# Patient Record
Sex: MALE
[De-identification: names, ages, dates, MRNs, and addresses within clinical notes are randomized per-mention and may not be internally consistent; named-entity substitution may affect disease eponyms.]

---

## 2022-02-14 ENCOUNTER — APPOINTMENT (OUTPATIENT)
Dept: ENDOCRINOLOGY | Facility: CLINIC | Age: 37
End: 2022-02-14

## 2022-02-14 PROBLEM — Z00.00 ENCOUNTER FOR PREVENTIVE HEALTH EXAMINATION: Status: ACTIVE | Noted: 2022-02-14

## 2022-05-19 ENCOUNTER — APPOINTMENT (OUTPATIENT)
Dept: ENDOCRINOLOGY | Facility: CLINIC | Age: 37
End: 2022-05-19
Payer: COMMERCIAL

## 2022-05-19 VITALS
RESPIRATION RATE: 15 BRPM | SYSTOLIC BLOOD PRESSURE: 134 MMHG | TEMPERATURE: 98.6 F | BODY MASS INDEX: 27.4 KG/M2 | HEART RATE: 74 BPM | OXYGEN SATURATION: 98 % | HEIGHT: 69 IN | WEIGHT: 185 LBS | DIASTOLIC BLOOD PRESSURE: 80 MMHG

## 2022-05-19 DIAGNOSIS — R79.89 OTHER SPECIFIED ABNORMAL FINDINGS OF BLOOD CHEMISTRY: ICD-10-CM

## 2022-05-19 DIAGNOSIS — Z78.9 OTHER SPECIFIED HEALTH STATUS: ICD-10-CM

## 2022-05-19 DIAGNOSIS — I10 ESSENTIAL (PRIMARY) HYPERTENSION: ICD-10-CM

## 2022-05-19 DIAGNOSIS — E11.9 TYPE 2 DIABETES MELLITUS W/OUT COMPLICATIONS: ICD-10-CM

## 2022-05-19 DIAGNOSIS — E78.5 HYPERLIPIDEMIA, UNSPECIFIED: ICD-10-CM

## 2022-05-19 DIAGNOSIS — Z87.891 PERSONAL HISTORY OF NICOTINE DEPENDENCE: ICD-10-CM

## 2022-05-19 PROCEDURE — 99205 OFFICE O/P NEW HI 60 MIN: CPT

## 2022-05-19 RX ORDER — SIMVASTATIN 20 MG/1
20 TABLET, FILM COATED ORAL
Refills: 0 | Status: ACTIVE | COMMUNITY

## 2022-05-19 RX ORDER — METFORMIN HYDROCHLORIDE 1000 MG/1
1000 TABLET, COATED ORAL
Refills: 0 | Status: ACTIVE | COMMUNITY

## 2022-05-19 RX ORDER — LISINOPRIL 10 MG/1
10 TABLET ORAL
Refills: 0 | Status: ACTIVE | COMMUNITY

## 2022-05-19 RX ORDER — DAPAGLIFLOZIN 10 MG/1
10 TABLET, FILM COATED ORAL
Refills: 0 | Status: ACTIVE | COMMUNITY

## 2022-05-24 ENCOUNTER — APPOINTMENT (OUTPATIENT)
Dept: ENDOCRINOLOGY | Facility: CLINIC | Age: 37
End: 2022-05-24

## 2022-07-20 ENCOUNTER — FORM ENCOUNTER (OUTPATIENT)
Age: 37
End: 2022-07-20

## 2022-08-01 ENCOUNTER — NON-APPOINTMENT (OUTPATIENT)
Age: 37
End: 2022-08-01

## 2022-09-13 LAB
HBA1C MFR BLD HPLC: 7.1
LDLC SERPL DIRECT ASSAY-MCNC: 96

## 2022-09-14 ENCOUNTER — APPOINTMENT (OUTPATIENT)
Dept: ENDOCRINOLOGY | Facility: CLINIC | Age: 37
End: 2022-09-14

## 2022-12-03 ENCOUNTER — APPOINTMENT (OUTPATIENT)
Dept: ORTHOPEDIC SURGERY | Facility: CLINIC | Age: 37
End: 2022-12-03

## 2022-12-03 VITALS
WEIGHT: 195 LBS | HEART RATE: 98 BPM | HEIGHT: 69 IN | SYSTOLIC BLOOD PRESSURE: 113 MMHG | TEMPERATURE: 97.9 F | BODY MASS INDEX: 28.88 KG/M2 | DIASTOLIC BLOOD PRESSURE: 76 MMHG

## 2022-12-03 PROCEDURE — 72100 X-RAY EXAM L-S SPINE 2/3 VWS: CPT

## 2022-12-03 PROCEDURE — 99204 OFFICE O/P NEW MOD 45 MIN: CPT

## 2022-12-03 NOTE — DISCUSSION/SUMMARY
[de-identified] : At this time I do want to go ahead and get an MRI of the lumbar spine without contrast to evaluate for disc herniation causing the left lower extremity neuropathy,?  Sciatica.  In the interim the patient will continue with the ibuprofen which does help him temporarily.  I gave him a physical therapy prescription to perform stretching and core strengthening exercises.  Once the MRI is completed I will call the patient and perform a TTM with him about the results.  If there is a disc herniation there, I will refer him to a physiatrist.  All of his questions were answered and the patient understood and agreed to the treatment plan.

## 2022-12-03 NOTE — PHYSICAL EXAM
[de-identified] : Lumbosacral Physical Examination: \par \par General: Alert and oriented x3.  In no acute distress.  Pleasant in nature with a normal affect.  No apparent respiratory distress. \par Inspection: No swelling, No scoliosis present.\par \par ROM:\par Forward Flexion: 70 degrees\par Extension: 20 degrees\par Lateral Flexion to Left: 30 degrees\par Lateral Flexion to Right: 30 degrees\par Rotation to Left: 40 degrees\par Rotation to Right: 40 degrees\par \par Normal Hip ROM.\par \par Palpation: \par Thoracolumbar Paraspinal Muscles: Positive\par Costovertebral Angle Pain/Spine Percussion: Negative for pain over the Kidney's with Spine Percussion.\par \par Special Tests:\par Straight Leg Raise: Positive left lower extremity.\par \par Neurovascularly Intact Sensory and Motor with No Foot Drop present on examination today.  [de-identified] : X-rays 2 views lumbar spine reviewed, 12/3/2022: Normal x-rays of the lumbar spine.

## 2022-12-03 NOTE — HISTORY OF PRESENT ILLNESS
[de-identified] : The patient is a 37-year-old male who presents with left-sided lower back pain with pain radiating down his left leg.  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  About 4-6 weeks ago he woke up and started to have severe pain shooting down his left leg.  He states that every morning he wakes up and he has a weakness and pain in his left leg and it takes him some time to get going and walking.  He denies bowel or bladder incontinence.  He has no weakness in his legs at this time or ankles.  He takes 4 ibuprofen every morning since his injury 4 to 6 weeks ago which helped some temporarily.  He is concerned at this time that the pain has not subsided.  He is here to be evaluated.

## 2022-12-14 ENCOUNTER — OUTPATIENT (OUTPATIENT)
Dept: OUTPATIENT SERVICES | Facility: HOSPITAL | Age: 37
LOS: 1 days | End: 2022-12-14
Payer: COMMERCIAL

## 2022-12-14 ENCOUNTER — APPOINTMENT (OUTPATIENT)
Dept: MRI IMAGING | Facility: CLINIC | Age: 37
End: 2022-12-14

## 2022-12-14 DIAGNOSIS — M54.42 LUMBAGO WITH SCIATICA, LEFT SIDE: ICD-10-CM

## 2022-12-14 PROCEDURE — 72148 MRI LUMBAR SPINE W/O DYE: CPT | Mod: 26

## 2022-12-14 PROCEDURE — 72148 MRI LUMBAR SPINE W/O DYE: CPT

## 2022-12-21 ENCOUNTER — APPOINTMENT (OUTPATIENT)
Dept: ORTHOPEDIC SURGERY | Facility: CLINIC | Age: 37
End: 2022-12-21

## 2022-12-21 DIAGNOSIS — G89.29 LUMBAGO WITH SCIATICA, LEFT SIDE: ICD-10-CM

## 2022-12-21 DIAGNOSIS — M54.42 LUMBAGO WITH SCIATICA, LEFT SIDE: ICD-10-CM

## 2022-12-21 PROCEDURE — 99446 NTRPROF PH1/NTRNET/EHR 5-10: CPT

## 2023-01-20 ENCOUNTER — APPOINTMENT (OUTPATIENT)
Dept: PHYSICAL MEDICINE AND REHAB | Facility: CLINIC | Age: 38
End: 2023-01-20
Payer: COMMERCIAL

## 2023-01-20 VITALS
HEIGHT: 69 IN | SYSTOLIC BLOOD PRESSURE: 116 MMHG | HEART RATE: 83 BPM | RESPIRATION RATE: 14 BRPM | WEIGHT: 200 LBS | DIASTOLIC BLOOD PRESSURE: 68 MMHG | BODY MASS INDEX: 29.62 KG/M2

## 2023-01-20 DIAGNOSIS — Z86.79 PERSONAL HISTORY OF OTHER DISEASES OF THE CIRCULATORY SYSTEM: ICD-10-CM

## 2023-01-20 DIAGNOSIS — M54.16 RADICULOPATHY, LUMBAR REGION: ICD-10-CM

## 2023-01-20 DIAGNOSIS — Z78.9 OTHER SPECIFIED HEALTH STATUS: ICD-10-CM

## 2023-01-20 DIAGNOSIS — Z86.39 PERSONAL HISTORY OF OTHER ENDOCRINE, NUTRITIONAL AND METABOLIC DISEASE: ICD-10-CM

## 2023-01-20 DIAGNOSIS — M51.26 OTHER INTERVERTEBRAL DISC DISPLACEMENT, LUMBAR REGION: ICD-10-CM

## 2023-01-20 PROCEDURE — 99203 OFFICE O/P NEW LOW 30 MIN: CPT

## 2023-01-20 NOTE — DATA REVIEWED
[FreeTextEntry1] : MR L Spine 12/14/22 reviewed by me: L5-S1 disc protrusion seen that abuts left S1 nerve root\par \par   MR Lumbar Spine No Cont             Final\par \par No Documents Attached\par \par \par \par \par   EXAM: [id number] - MR SPINE LUMBAR  - ORDERED BY: ZOHREH GOULD\par \par \par PROCEDURE DATE:  12/14/2022\par \par \par \par INTERPRETATION:  CLINICAL INFORMATION: Back pain and left radiculopathy\par \par ADDITIONAL CLINICAL INFORMATION: Other Condition see Clinical Info\par \par TECHNIQUE: Multiplanar, multisequence MRI was performed of the lumbar spine.\par IV Contrast: NONE\par \par PRIOR STUDIES: No priors available for comparison.\par \par FINDINGS:\par \par LOCALIZER: No additional findings.\par BONES: Vertebral body heights are maintained.\par ALIGNMENT: The alignment is normal.\par SACROILIAC JOINTS/SACRUM: The visualized portions are unremarkable.\par CONUS AND CAUDA EQUINA: The distal cord and conus are normal in signal. Conus terminates at L1.\par VISUALIZED INTRAPELVIC/INTRA-ABDOMINAL SOFT TISSUES: Normal.\par PARASPINAL SOFT TISSUES: Normal.\par \par \par INDIVIDUAL LEVELS:\par \par LOWER THORACIC SPINE: No spinal canal or neuroforaminal stenosis.\par \par L1-L2: No spinal canal or neuroforaminal stenosis.\par L2-L3: No spinal canal or neuroforaminal stenosis.\par L3-L4: No spinal canal or neuroforaminal stenosis.\par L4-L5: No spinal canal or neuroforaminal stenosis.\par L5-S1: Small broad-based central-left paracentral disc protrusion which abuts the descending left S1 nerve root. No spinal canal or foraminal narrowing.\par \par \par IMPRESSION:\par \par Small broad-based central-left paracentral disc protrusion at L5-S1 which abuts the descending left S1 nerve root.\par \par --- End of Report ---\par \par \par \par \par \par \par JULIA NGUYEN MD; Attending Radiologist\par This document has been electronically signed. Dec 16 2022 11:19AM\par \par  \par \par  Ordered by: ZOHREH GOULD       Collected/Examined: [id number] 10:49AM       \par Verified by: ZOHREH GOULD [id number] 12:54PM       \par  Result Communication: No patient communication needed at this time;\par Stage: Final       \par  Performed at: [facility] [place]       Resulted: [id number] 11:12AM       Last Updated: [id number] 12:54PM       Accession: [id number]

## 2023-01-20 NOTE — HISTORY OF PRESENT ILLNESS
[FreeTextEntry1] : Mr. ISABEL MUELLER is a 37 year old male with a PMHx of DM2, HTN who presents with low back pain. \par \par Denies any pain at this time, previous pain a few days ago:\par Location:Left low back\par Onset:Started late 10/2022, no inciting event but does remember injuring his left lower back/hip getting into his work truck about a year ago. \par Provocation/Palliative:Worse in the morning when he wakes up, slightly improves throughout day\par Quality:Cramping, Achy\par Radiation:Can radiate down his LLE down in a S1 distribution\par Severity:started severe, then went to moderate-mild\par Timing:Improving with time\par \par Did complain of tingling down his LLE, none anymore.  Denies any associated leg weakness. Denies any loss of bowel/bladder control or any groin numbness.\par Previous medications trialed:Ibuprofen with relief, has not taken in a week\par Previous procedures relevant to complaint:None\par Conservative therapy tried?:Currently in PT and chiropractic with relief, completing a HEP

## 2023-01-20 NOTE — ASSESSMENT
[FreeTextEntry1] : Mr. ISABEL MUELLER is a 37 year old male who presents with recent history of left sided low back pain going into his LLE consistent with a lumbar radiculopathy, with MRI findings of L S1 nerve root abutment. Denies any significant pain at this time since starting PT/chiropractic and taking Ibuprofen PRN. Denies any red flag signs. Will recommend:\par - MRI L Spine reviewed \par - Continue HEP as shown by PT, stressed importance of this in preventing another flare up\par - Patient to take Ibuprofen/Tylenol PRN, has not needed it in a week\par \par RTC as needed. Patient aware of red flag signs including any changes to their bowel/bladder control, groin numbness or new weakness. Patient knows to seek immediate attention by calling 911 or going to nearest ER if these symptoms appear.

## 2023-01-20 NOTE — PHYSICAL EXAM
[FreeTextEntry1] : PE:\par Constitutional: In NAD, calm and cooperative\par MSK (Back)\par 	Inspection: no gross swelling identified\par 	Palpation: No tenderness of the bilateral lower lumbar paraspinals\par 	ROM: No pain at end lumbar extension/flexion, AROM intact\par 	Strength: 5/5 strength in bilateral lower extremities\par 	Reflexes: 2+ Patella reflex bilaterally, 2+ Achilles reflex bilaterally, negative clonus bilaterally\par 	Sensation: Intact to light touch in bilateral lower extremities\par Special tests:\par SLR:negative bilaterally\par VITO:negative bilaterally\par FADIR: negative bilaterally

## 2023-05-23 ENCOUNTER — APPOINTMENT (OUTPATIENT)
Dept: CT IMAGING | Facility: CLINIC | Age: 38
End: 2023-05-23
Payer: COMMERCIAL

## 2023-05-23 ENCOUNTER — OUTPATIENT (OUTPATIENT)
Dept: OUTPATIENT SERVICES | Facility: HOSPITAL | Age: 38
LOS: 1 days | End: 2023-05-23
Payer: COMMERCIAL

## 2023-05-23 DIAGNOSIS — Z00.8 ENCOUNTER FOR OTHER GENERAL EXAMINATION: ICD-10-CM

## 2023-05-23 DIAGNOSIS — J33.0 POLYP OF NASAL CAVITY: ICD-10-CM

## 2023-05-23 PROCEDURE — 70486 CT MAXILLOFACIAL W/O DYE: CPT | Mod: 26

## 2023-05-23 PROCEDURE — 70486 CT MAXILLOFACIAL W/O DYE: CPT

## 2023-10-01 PROBLEM — M54.16 LUMBAR RADICULAR PAIN: Status: ACTIVE | Noted: 2023-01-20
